# Patient Record
Sex: FEMALE | Race: OTHER
[De-identification: names, ages, dates, MRNs, and addresses within clinical notes are randomized per-mention and may not be internally consistent; named-entity substitution may affect disease eponyms.]

---

## 2017-01-23 ENCOUNTER — HOSPITAL ENCOUNTER (OUTPATIENT)
Dept: HOSPITAL 80 - BHFA | Age: 82
End: 2017-01-23
Attending: INTERNAL MEDICINE
Payer: COMMERCIAL

## 2017-01-23 DIAGNOSIS — R06.02: Primary | ICD-10-CM

## 2017-01-23 PROCEDURE — A9500 TC99M SESTAMIBI: HCPCS

## 2017-01-23 PROCEDURE — 93017 CV STRESS TEST TRACING ONLY: CPT

## 2017-01-23 PROCEDURE — 78452 HT MUSCLE IMAGE SPECT MULT: CPT

## 2017-02-08 ENCOUNTER — HOSPITAL ENCOUNTER (OUTPATIENT)
Dept: HOSPITAL 80 - FIMAGING | Age: 82
End: 2017-02-08
Attending: GENERAL ACUTE CARE HOSPITAL
Payer: COMMERCIAL

## 2017-02-08 DIAGNOSIS — I51.7: ICD-10-CM

## 2017-02-08 DIAGNOSIS — J84.9: Primary | ICD-10-CM

## 2017-02-08 DIAGNOSIS — I77.1: ICD-10-CM

## 2017-02-08 DIAGNOSIS — M40.205: ICD-10-CM

## 2017-02-08 NOTE — DX
Chest, PA and Lateral



History: Dyspnea, R06.02



Comparison: April 11, 2015



Findings: There is diffuse chronic interstitial disease, without focal consolidation or pleural effus
ion. The heart remains enlarged with a globular shape raising the possibility of pericardial effusion
. The pulmonary vascularity is not plethoric. There is chronic tortuosity of the thoracic aorta. Ther
e is possibly stable subcarinal adenopathy vs density related to left atrial enlargement.. There is n
o lung parenchymal mass. There is no pleural effusion or pneumothorax. A focal kyphosis centered at t
he thoracolumbar junction is stable. There are no thoracic compression abnormalities. 



Impression: 

1. Chronic interstitial lung disease. The kyphosis may contribute a restrictive component.

2. Cardiomegaly without failure.

3. Nothing acute identified.

## 2017-09-02 ENCOUNTER — HOSPITAL ENCOUNTER (EMERGENCY)
Dept: HOSPITAL 80 - FED | Age: 82
Discharge: HOME | End: 2017-09-02
Payer: COMMERCIAL

## 2017-09-02 VITALS
SYSTOLIC BLOOD PRESSURE: 123 MMHG | DIASTOLIC BLOOD PRESSURE: 74 MMHG | OXYGEN SATURATION: 93 % | HEART RATE: 82 BPM | RESPIRATION RATE: 18 BRPM | TEMPERATURE: 98.2 F

## 2017-09-02 DIAGNOSIS — Z79.01: ICD-10-CM

## 2017-09-02 DIAGNOSIS — I48.91: Primary | ICD-10-CM

## 2017-09-02 DIAGNOSIS — J81.1: ICD-10-CM

## 2017-09-02 DIAGNOSIS — Z79.82: ICD-10-CM

## 2017-09-02 LAB
% IMMATURE GRANULYOCYTES: 0.5 % (ref 0–1.1)
ABSOLUTE IMMATURE GRANULOCYTES: 0.03 10^3/UL (ref 0–0.1)
ABSOLUTE NRBC COUNT: 0 10^3/UL (ref 0–0.01)
ADD DIFF?: NO
ADD MORPH?: NO
ADD SCAN?: NO
ANION GAP SERPL CALC-SCNC: 12 MEQ/L (ref 8–16)
ATYPICAL LYMPHOCYTE FLAG: 10 (ref 0–99)
CALCIUM SERPL-MCNC: 9.8 MG/DL (ref 8.5–10.4)
CHLORIDE SERPL-SCNC: 96 MEQ/L (ref 97–110)
CO2 SERPL-SCNC: 27 MEQ/L (ref 22–31)
CREAT SERPL-MCNC: 0.8 MG/DL (ref 0.6–1)
ERYTHROCYTE [DISTWIDTH] IN BLOOD BY AUTOMATED COUNT: 16 % (ref 11.5–15.2)
FRAGMENT RBC FLAG: 0 (ref 0–99)
GFR SERPL CREATININE-BSD FRML MDRD: > 60 ML/MIN/{1.73_M2}
GLUCOSE SERPL-MCNC: 115 MG/DL (ref 70–100)
HCT VFR BLD CALC: 32.2 % (ref 38–47)
HGB BLD-MCNC: 10.8 G/DL (ref 12.6–16.3)
INR PPP: 2.54 (ref 0.83–1.16)
LEFT SHIFT FLG: 0 (ref 0–99)
LIPEMIA HEMOLYSIS FLAG: 80 (ref 0–99)
MCH RBC BLDCO QN: 30.2 PG (ref 27.9–34.1)
MCHC RBC AUTO-ENTMCNC: 33.5 G/DL (ref 32.4–36.7)
MCV RBC AUTO: 89.9 FL (ref 81.5–99.8)
NRBC-AUTO%: 0 % (ref 0–0.2)
PLATELET # BLD: 167 10^3/UL (ref 150–400)
PLATELET CLUMPS FLAG: 0 (ref 0–99)
PMV BLD AUTO: 11.4 FL (ref 8.7–11.7)
POTASSIUM SERPL-SCNC: 4.1 MEQ/L (ref 3.5–5.2)
PROTHROMBIN TIME: 27.6 SEC (ref 12–15)
RBC # BLD AUTO: 3.58 10^6/UL (ref 4.18–5.33)
SODIUM SERPL-SCNC: 135 MEQ/L (ref 134–144)
TROPONIN I SERPL-MCNC: < 0.012 NG/ML (ref 0–0.03)

## 2017-09-02 PROCEDURE — 93005 ELECTROCARDIOGRAM TRACING: CPT

## 2017-09-02 PROCEDURE — 71020: CPT

## 2017-09-02 PROCEDURE — 96374 THER/PROPH/DIAG INJ IV PUSH: CPT

## 2017-09-02 PROCEDURE — 99285 EMERGENCY DEPT VISIT HI MDM: CPT

## 2017-09-02 NOTE — EDPHY
H & P


Stated Complaint: bi-lat swelling of feet new SOB


Time Seen by Provider: 09/02/17 08:16


HPI/ROS: 





CHIEF COMPLAINT:  PND, several weeks of leg swelling, history of atrial 

fibrillation





HISTORY OF PRESENT ILLNESS:  The patient presents the emergency department with 

complaints of paroxysmal nocturnal dyspnea increasing over the past several 

nights.  The patient has a history of chronic atrial fibrillation and is 

anticoagulated with Coumadin.  She typically manages her rate with 25 mg of 

metoprolol day.  Over the past day she has taken 50 mg.  The patient recently 

traveled to [facility] and over the past several weeks has developed increasing 

lower extremity edema.  The patient denies any chest pain.  She does report 

mild dyspnea.  The patient's dyspnea was much worse last night however is 

feeling better now that she has been upright.  She denies any complaints of 

fever, acute rash, melena or acute neurologic symptoms.





REVIEW OF SYSTEMS:


A comprehensive 10 point review of systems is otherwise negative aside from 

elements mentioned in the history of present illness.


Source: Patient


Exam Limitations: No limitations





- Personal History


Current Tetanus/Diphtheria Vaccine: Yes


Current Tetanus Diphtheria and Acellular Pertussis (TDAP): Yes


Tetanus Vaccine Date: 10/12/13





- Medical/Surgical History


Hx Asthma: No


Hx Chronic Respiratory Disease: No


Hx Diabetes: No


Hx Cardiac Disease: No


Hx Renal Disease: No


Hx Cirrhosis: No


Hx Alcoholism: No


Hx HIV/AIDS: No


Hx Splenectomy or Spleen Trauma: No


Other PMH: Paroxysmal Afib, hypothyroid, small bowel obstruction, lysis of 

adhesions, osteoarthritis, GERD





- Social History


Smoking Status: Never smoked





- Physical Exam


Exam: 





General Appearance:  Elderly female, no acute distress


Eyes:  Pupils equal and round no pallor or injection


ENT, Mouth:  Mucous membranes moist


Respiratory:  Rales bilateral lung fields


Cardiovascular:  Tachycardic, irregular


Gastrointestinal:  Abdomen is soft and nontender, no masses, bowel sounds normal


Neurological:  A&O, normal motor function, normal sensory exam, normal cranial 

nerves


Skin:  Warm and dry, no rashes


Musculoskeletal:  Neck is supple nontender


Extremities:  1+ bilateral pitting edema





Constitutional: 


 Initial Vital Signs











Temperature (C)  36.4 C   09/02/17 08:12


 


Heart Rate  114 H  09/02/17 08:12


 


Respiratory Rate  16   09/02/17 08:12


 


Blood Pressure  136/95 H  09/02/17 08:12


 


O2 Sat (%)  91 L  09/02/17 08:12








 











O2 Delivery Mode               Room Air














Allergies/Adverse Reactions: 


 





No Known Allergies Allergy (Unverified 09/01/14 03:37)


 








Home Medications: 














 Medication  Instructions  Recorded


 


Calcium Carbonate [Oyster Shell 500 mg PO TIDMEAL 04/24/13





Calcium 500 mg (*)]  


 


Cholecalciferol Vit D3 [Vitamin D3 1,000 units PO DAILY 04/24/13





(*)]  


 


Levothyroxine [Synthroid 50 mcg 50 mcg PO DAILY06 04/24/13





(*)]  


 


Multivitamins [Multivitamin (*)] 1 each PO DAILY 04/24/13


 


Ranitidine HCl [Ranitidine HCl 150 150 mg PO DAILY 04/24/13





mg]  


 


Aspirin EC [Aspirin EC 81 mg (*)] 81 mg PO DAILY@1300 10/14/13


 


Metoprolol Tartrate [Lopressor 25 25 mg PO BID #60 tab 09/08/14





mg (*)]  


 


Hydrocodone/Acetaminophen [Norco 1 each PO Q6 PRN 09/10/14





5/325 (*)]  


 


Furosemide [Lasix 20 MG (*)] 20 mg PO DAILY #7 tab 09/02/17














Medical Decision Making





- Diagnostics


EKG Interpretation: 





EKG:  Complete interpretation has been separately recorded in the [facility] 

archive.  Summary impression:  Atrial fibrillation, rate 100, underlying right 

bundle branch block, unchanged from prior EKG


Imaging Results: 


 Imaging Impressions





Chest X-Ray  09/02/17 08:21


Impression: Stable chest. Diffuse interstitial thickening..











ED Course/Re-evaluation: 





The patient presents to the emergency department with PND, rales on exam and 

lower extremity edema.  The patient's oxygen saturation is 91% on room air.  

She is noted to be in atrial fibrillation with its in the 100-120 range.





The patient had an IV established.  She was placed on a cardiac monitor.  I 

reviewed the patient's past medical records.





The patient's chest x-ray demonstrates no evidence of overwhelming heart 

failure.  She has chronic interstitial lung markings.





The patient received 20 mg of IV Lasix.  The patient's proBNP level was noted 

to be elevated.





Patient was re-evaluated at 11:00 a.m..  She has gone up to the bathroom twice 

since receiving IV Lasix.  She is feeling better.  At this point time she is 

not acutely hypoxic.  She has relative rate control of her atrial fibrillation.

  She would like to manage her symptoms at home today with a small trial of 

Lasix for the next several days.  The patient understands to return to the 

emergency department for any worsening symptoms or other concerns.  I have 

asked her to increase her metoprolol to 50 mg daily.





The patient will follow up with her regular cardiologist.  She is discharged 

home with customary aftercare instructions and return precautions.  At 11:50 

a.m. patient's blood pressure is normal, heart rate is 89, oxygen saturation 94

% on room air.


Differential Diagnosis: 





Differential diagnosis considered includes atrial fibrillation, congestive 

heart failure, peripheral edema, myocardial infarction, pulmonary embolism is 

felt to be on likely secondary to her chronic anticoagulation.





- Data Points


Laboratory Results: 


 Laboratory Results





 09/02/17 08:40 





 09/02/17 08:40 





 











  09/02/17 09/02/17 09/02/17





  08:40 08:40 08:40


 


WBC      6.27 10^3/uL 10^3/uL





     (3.80-9.50) 


 


RBC      3.58 10^6/uL L 10^6/uL





     (4.18-5.33) 


 


Hgb      10.8 g/dL L g/dL





     (12.6-16.3) 


 


Hct      32.2 % L %





     (38.0-47.0) 


 


MCV      89.9 fL fL





     (81.5-99.8) 


 


MCH      30.2 pg pg





     (27.9-34.1) 


 


MCHC      33.5 g/dL g/dL





     (32.4-36.7) 


 


RDW      16.0 % H %





     (11.5-15.2) 


 


Plt Count      167 10^3/uL 10^3/uL





     (150-400) 


 


MPV      11.4 fL fL





     (8.7-11.7) 


 


Neut % (Auto)      79.8 % H %





     (39.3-74.2) 


 


Lymph % (Auto)      12.3 % L %





     (15.0-45.0) 


 


Mono % (Auto)      5.1 % %





     (4.5-13.0) 


 


Eos % (Auto)      1.3 % %





     (0.6-7.6) 


 


Baso % (Auto)      1.0 % %





     (0.3-1.7) 


 


Nucleat RBC Rel Count      0.0 % %





     (0.0-0.2) 


 


Absolute Neuts (auto)      5.01 10^3/uL 10^3/uL





     (1.70-6.50) 


 


Absolute Lymphs (auto)      0.77 10^3/uL L 10^3/uL





     (1.00-3.00) 


 


Absolute Monos (auto)      0.32 10^3/uL 10^3/uL





     (0.30-0.80) 


 


Absolute Eos (auto)      0.08 10^3/uL 10^3/uL





     (0.03-0.40) 


 


Absolute Basos (auto)      0.06 10^3/uL 10^3/uL





     (0.02-0.10) 


 


Absolute Nucleated RBC      0.00 10^3/uL 10^3/uL





     (0-0.01) 


 


Immature Gran %      0.5 % %





     (0.0-1.1) 


 


Immature Gran #      0.03 10^3/uL 10^3/uL





     (0.00-0.10) 


 


PT    27.6 SEC H SEC  





    (12.0-15.0)  


 


INR    2.54  H   





    (0.83-1.16)  


 


Sodium  135 mEq/L mEq/L    





   (134-144)   


 


Potassium  4.1 mEq/L mEq/L    





   (3.5-5.2)   


 


Chloride  96 mEq/L L mEq/L    





   ()   


 


Carbon Dioxide  27 mEq/l mEq/l    





   (22-31)   


 


Anion Gap  12 mEq/L mEq/L    





   (8-16)   


 


BUN  17 mg/dL mg/dL    





   (7-23)   


 


Creatinine  0.8 mg/dL mg/dL    





   (0.6-1.0)   


 


Estimated GFR  > 60     





    


 


Glucose  115 mg/dL H mg/dL    





   ()   


 


Calcium  9.8 mg/dL mg/dL    





   (8.5-10.4)   


 


Troponin I  < 0.012 ng/mL ng/mL    





   (0.000-0.034)   


 


NT-Pro-B Natriuret Pep  5530 pg/mL H pg/mL    





   (0-450)   











Medications Given: 


 








Discontinued Medications





Furosemide (Lasix Injection)  20 mg IVP EDNOW ONE


   Stop: 09/02/17 09:25


   Last Admin: 09/02/17 09:33 Dose:  20 mg








Departure





- Departure


Disposition: Home, Routine, Self-Care


Clinical Impression: 


 Dyspnea, Pulmonary edema, Atrial fibrillation





Condition: Good


Instructions:  A-fib (Atrial Fibrillation) (ED)


Additional Instructions: 


1.  Take a full 50 mg dose of metoprolol daily.


2.  Begin Lasix as directed for the next 5 days - 20 mg tablet once daily.


3.  Please return to the ED for any markedly worsening symptoms of chest pain, 

shortness of breath or other concerns.


4.  Please follow up with your regular cardiologist for a recheck early next 

week.








Referrals: 


Jacky Burk MD [Medical Doctor] - As per Instructions


Prescriptions: 


Furosemide [Lasix 20 MG (*)] 20 mg PO DAILY #7 tab

## 2017-09-02 NOTE — CPEKG
Heart Rate: 100

RR Interval: 600

QRSD Interval: 124

QT Interval: 352

QTC Interval: 454

QRS Axis: 45

T Wave Axis: 216

EKG Severity - ABNORMAL ECG -

EKG Impression: ATRIAL FIBRILLATION, V-RATE 

EKG Impression: RIGHT BUNDLE BRANCH BLOCK

Electronically Signed By: Chava Méndez 02-Sep-2017 08:35:22

## 2017-09-15 ENCOUNTER — HOSPITAL ENCOUNTER (OUTPATIENT)
Dept: HOSPITAL 80 - FIMAGING | Age: 82
End: 2017-09-15
Attending: FAMILY MEDICINE
Payer: COMMERCIAL

## 2017-09-15 DIAGNOSIS — Z12.31: Primary | ICD-10-CM

## 2017-09-15 PROCEDURE — G0202 SCR MAMMO BI INCL CAD: HCPCS

## 2018-04-24 ENCOUNTER — HOSPITAL ENCOUNTER (EMERGENCY)
Dept: HOSPITAL 80 - FED | Age: 83
Discharge: HOME | End: 2018-04-24
Payer: COMMERCIAL

## 2018-04-24 VITALS — SYSTOLIC BLOOD PRESSURE: 128 MMHG | DIASTOLIC BLOOD PRESSURE: 73 MMHG

## 2018-04-24 DIAGNOSIS — R07.0: Primary | ICD-10-CM

## 2018-04-24 DIAGNOSIS — Z87.891: ICD-10-CM

## 2018-04-24 LAB
INR PPP: 3.12 (ref 0.83–1.16)
PLATELET # BLD: 145 10^3/UL (ref 150–400)
PROTHROMBIN TIME: 31.9 SEC (ref 12–15)

## 2018-04-24 PROCEDURE — 96374 THER/PROPH/DIAG INJ IV PUSH: CPT

## 2018-04-24 PROCEDURE — 70491 CT SOFT TISSUE NECK W/DYE: CPT

## 2018-04-24 PROCEDURE — 99285 EMERGENCY DEPT VISIT HI MDM: CPT

## 2018-04-24 NOTE — EDPHY
H & P


Time Seen by Provider: 04/24/18 04:08


HPI/ROS: 





HPI





CHIEF COMPLAINT:  Severe throat pain.





HISTORY OF PRESENT ILLNESS:  Patient very pleasant 82-year-old female she has a 

history of AFib on Coumadin, thyroid disease, she presents emergency room with 

severe throat pain.  Patient states she woke up to have a drink around 2:00 

a.m. Try to take a big gulp of water and developed severe throat pain.  States 

she went to bed fine however she did have a bloody nose earlier in the evening 

and did take a dose of Afrin.  Otherwise denies cough, chest pain, shortness of 

breath, denies extremity pain.  Denies headache or posterior neck pain.  Pain 

is located to the posterior pharynx pain when she swallows.  She denies having 

any trouble swallowing but has pain when she swallows.





Past Medical History:  AFib on Coumadin, thyroid disease, GERD





Past Surgical History:  No recent surgery





Social History:  Lives locally, family at bedside.  Denies drugs alcohol 

tobacco products.





Family History:  Noncontributory








ROS   


REVIEW OF SYSTEMS:


A comprehensive 10 point review of systems is otherwise negative aside from 

elements mentioned in the history of present illness.








Exam   


Constitutional appears well nontoxic no acute distress, frail elderly,  triage 

nursing summary reviewed, vital signs reviewed, awake/alert. 


Eyes   normal conjunctivae and sclera, EOMI, PERRLA. 


HENT posterior pharynx is unremarkable no significant swelling or erythema, no 

exudate, uvula midline, tonsillar tissue was absent, no neck swelling, no Mark

's, no stridor, no drooling, no trouble breathing, moist mucus membranes, no 

epistaxis, neck supple/ no meningismus, no raccoon eyes. 


Respiratory   clear to auscultation bilaterally, normal breath sounds, no 

respiratory distress, no wheezing. 


Cardiovascular   rate normal, regular rhythm, no murmur, no edema, distal 

pulses normal. 


Gastrointestinal   soft, non-tender, no rebound, no guarding, normal bowel 

sounds, no distension, no pulsatile mass. 


Genitourinary   no CVA tenderness. 


Musculoskeletal  no midline vertebral tenderness, full range of motion, no calf 

swelling, no tenderness of extremities, no meningismus, good pulses, 

neurovascularly intact.


Skin   pink, warm, & dry, no rash, skin atraumatic. 


Neurologic   awake, alert and oriented x 3, AAOx3, moves all 4 extremities 

equally, motor intact, sensory intact, CN II-XII intact, normal cerebellar, 

normal vision, normal speech. 


Psychiatric   normal mood/affect. 


Heme/Lymph/Immune   no lymphadenopathy.





Differential Diagnosis:  Includes but is not limited to in a particular order 

pharyngitis, viral pharyngitis, bacterial pharyngitis, GERD, retropharyngeal 

abscess, infection, foreign body, throat irritation





Medical Decision Making:  Plan for this patient IV establishment basic blood 

draw, check CBC and chemistry, check INR as she is on Coumadin had a nosebleed 

earlier, CT scan soft tissue with IV contrast of the neck, GI cocktail to see 

if this improves her symptoms.





Re-evaluation:








0615:  Re-evaluation at this time patient states that her pain when she 

swallows has resolved after the lidocaine neb she feels much better.  Still 

pending CT soft tissue neck with IV contrast to be red and reported.





0723:  I did re-evaluate the patient she is resting comfortably feels much 

better.  She is able swallow without any difficulty.  She states she feels much 

better.  Her CT scan soft tissue neck with IV contrast did not show any acute 

inflammatory process this was called to me by Dr. Penn.





No evidence of inflammation on the CT scan.  Blood work is reassuring.  

Coumadin level 3.5.  She has no stridor no trouble breathing.





Discussed return precautions with the son at bedside.  Additionally will given 

ENT referral K she continues to have some throat pain.


I do not see any evidence of infection on exam.





Blood work and CT reassuring.





Return precautions discussed.





Recommend cold fluids today and a soft diet.


Source: Patient





- Personal History


Tetanus Vaccine Date: 10/12/13





- Medical/Surgical History


Hx Asthma: No


Hx Chronic Respiratory Disease: No


Hx Diabetes: No


Hx Cardiac Disease: No


Hx Renal Disease: No


Hx Cirrhosis: No


Hx Alcoholism: No


Hx HIV/AIDS: No


Hx Splenectomy or Spleen Trauma: No


Other PMH: Paroxysmal Afib, hypothyroid, small bowel obstruction, lysis of 

adhesions, osteoarthritis, GERD





- Social History


Smoking Status: Never smoked


Constitutional: 


 Initial Vital Signs











Temperature (C)  36.4 C   04/24/18 04:00


 


Heart Rate  79   04/24/18 04:00


 


Respiratory Rate  18   04/24/18 04:00


 


Blood Pressure  132/97 H  04/24/18 04:00


 


O2 Sat (%)  96   04/24/18 04:00








 











O2 Delivery Mode               Room Air














Allergies/Adverse Reactions: 


 





No Known Allergies Allergy (Unverified 04/24/18 04:13)


 








Home Medications: 














 Medication  Instructions  Recorded


 


Calcium Carbonate [Oyster Shell 500 mg PO TIDMEAL 04/24/13





Calcium 500 mg (*)]  


 


Cholecalciferol Vit D3 [Vitamin D3 1,000 units PO DAILY 04/24/13





(*)]  


 


Levothyroxine [Synthroid 50 mcg 50 mcg PO DAILY06 04/24/13





(*)]  


 


Multivitamins [Multivitamin (*)] 1 each PO DAILY 04/24/13


 


Ranitidine HCl [Ranitidine HCl 150 150 mg PO DAILY 04/24/13





mg]  


 


Aspirin EC [Aspirin EC 81 mg (*)] 81 mg PO DAILY@1300 10/14/13


 


Metoprolol Tartrate [Lopressor 25 25 mg PO BID #60 tab 09/08/14





mg (*)]  


 


Hydrocodone/Acetaminophen [Norco 1 each PO Q6 PRN 09/10/14





5/325 (*)]  


 


Furosemide [Lasix 20 MG (*)] 20 mg PO DAILY #7 tab 09/02/17














Medical Decision Making





- Data Points


Laboratory Results: 


 Laboratory Results





 04/24/18 04:20 





 04/24/18 04:20 





 











  04/24/18 04/24/18 04/24/18





  04:20 04:20 04:20


 


WBC      4.69 10^3/uL 10^3/uL





     (3.80-9.50) 


 


RBC      3.61 10^6/uL L 10^6/uL





     (4.18-5.33) 


 


Hgb      10.9 g/dL L g/dL





     (12.6-16.3) 


 


Hct      32.2 % L %





     (38.0-47.0) 


 


MCV      89.2 fL fL





     (81.5-99.8) 


 


MCH      30.2 pg pg





     (27.9-34.1) 


 


MCHC      33.9 g/dL g/dL





     (32.4-36.7) 


 


RDW      15.3 % H %





     (11.5-15.2) 


 


Plt Count      145 10^3/uL L 10^3/uL





     (150-400) 


 


MPV      10.7 fL fL





     (8.7-11.7) 


 


Neut % (Auto)      67.8 % %





     (39.3-74.2) 


 


Lymph % (Auto)      19.6 % %





     (15.0-45.0) 


 


Mono % (Auto)      9.0 % %





     (4.5-13.0) 


 


Eos % (Auto)      1.9 % %





     (0.6-7.6) 


 


Baso % (Auto)      1.3 % %





     (0.3-1.7) 


 


Nucleat RBC Rel Count      0.0 % %





     (0.0-0.2) 


 


Absolute Neuts (auto)      3.18 10^3/uL 10^3/uL





     (1.70-6.50) 


 


Absolute Lymphs (auto)      0.92 10^3/uL L 10^3/uL





     (1.00-3.00) 


 


Absolute Monos (auto)      0.42 10^3/uL 10^3/uL





     (0.30-0.80) 


 


Absolute Eos (auto)      0.09 10^3/uL 10^3/uL





     (0.03-0.40) 


 


Absolute Basos (auto)      0.06 10^3/uL 10^3/uL





     (0.02-0.10) 


 


Absolute Nucleated RBC      0.00 10^3/uL 10^3/uL





     (0-0.01) 


 


Immature Gran %      0.4 % %





     (0.0-1.1) 


 


Immature Gran #      0.02 10^3/uL 10^3/uL





     (0.00-0.10) 


 


PT    31.9 SEC H SEC  





    (12.0-15.0)  


 


INR    3.12  H   





    (0.83-1.16)  


 


APTT    49.4 SEC H SEC  





    (23.0-38.0)  


 


Sodium  134 mEq/L L mEq/L    





   (135-145)   


 


Potassium  3.9 mEq/L mEq/L    





   (3.5-5.2)   


 


Chloride  95 mEq/L L mEq/L    





   ()   


 


Carbon Dioxide  28 mEq/l mEq/l    





   (22-31)   


 


Anion Gap  11 mEq/L mEq/L    





   (8-16)   


 


BUN  25 mg/dL H mg/dL    





   (7-23)   


 


Creatinine  0.8 mg/dL mg/dL    





   (0.6-1.0)   


 


Estimated GFR  > 60     





    


 


Glucose  101 mg/dL H mg/dL    





   ()   


 


Calcium  9.3 mg/dL mg/dL    





   (8.5-10.4)   











Medications Given: 


 








Discontinued Medications





Al Hydroxide/Mg Hydroxide (Maalox Susp)  30 ml PO ONCE ONE


   Stop: 04/24/18 04:18


   Last Admin: 04/24/18 04:33 Dose:  30 ml


Hyoscyamine Sulfate (Levsin, Hyomax-Sl)  0.25 mg PO ONCE ONE


   Stop: 04/24/18 04:18


   Last Admin: 04/24/18 04:33 Dose:  0.25 mg


Lidocaine (Lidocaine 2% Viscous)  15 ml PO ONCE ONE


   Stop: 04/24/18 04:18


   Last Admin: 04/24/18 04:33 Dose:  15 ml


Lidocaine HCl (Xylocaine-Mpf 1.5%)  10 ml NB ONCE ONE


   Stop: 04/24/18 05:01


   Last Admin: 04/24/18 05:26 Dose:  10 ml








Departure





- Departure


Disposition: Home, Routine, Self-Care


Clinical Impression: 


 Throat pain





Condition: Good


Instructions:  Pharyngitis (ED)


Additional Instructions: 


1. Cold fluids today.


2. Return emergency room if you have any worsening symptoms includes worsening 

throat pain, fever, vomiting


3. Soft foods today.


4. Return emergency room if worsening pain.


5. Follow up with ENT.


Referrals: 


Diana Mark MD [Primary Care Provider] - As per Instructions


Ramsey Wing MD [Medical Doctor] - As per Instructions

## 2018-06-11 ENCOUNTER — HOSPITAL ENCOUNTER (OUTPATIENT)
Dept: HOSPITAL 80 - FIMAGING | Age: 83
End: 2018-06-11
Attending: PHYSICIAN ASSISTANT
Payer: COMMERCIAL

## 2018-06-11 DIAGNOSIS — R31.29: Primary | ICD-10-CM

## 2018-06-11 DIAGNOSIS — R35.0: ICD-10-CM

## 2018-07-28 ENCOUNTER — HOSPITAL ENCOUNTER (OUTPATIENT)
Dept: HOSPITAL 80 - FIMAGING | Age: 83
End: 2018-07-28
Attending: FAMILY MEDICINE
Payer: COMMERCIAL

## 2018-07-28 DIAGNOSIS — M50.30: Primary | ICD-10-CM

## 2018-07-28 DIAGNOSIS — M40.204: ICD-10-CM

## 2018-07-28 DIAGNOSIS — E03.9: ICD-10-CM

## 2018-09-22 ENCOUNTER — HOSPITAL ENCOUNTER (OUTPATIENT)
Dept: HOSPITAL 80 - FIMAGING | Age: 83
End: 2018-09-22
Attending: FAMILY MEDICINE
Payer: COMMERCIAL

## 2018-09-22 DIAGNOSIS — Z12.31: Primary | ICD-10-CM

## 2018-10-05 ENCOUNTER — HOSPITAL ENCOUNTER (OUTPATIENT)
Dept: HOSPITAL 80 - BHFA | Age: 83
End: 2018-10-05
Attending: INTERNAL MEDICINE
Payer: COMMERCIAL

## 2018-10-05 DIAGNOSIS — I48.91: Primary | ICD-10-CM

## 2019-03-31 ENCOUNTER — HOSPITAL ENCOUNTER (EMERGENCY)
Dept: HOSPITAL 80 - FED | Age: 84
Discharge: HOME | End: 2019-03-31
Payer: COMMERCIAL

## 2019-03-31 VITALS — SYSTOLIC BLOOD PRESSURE: 133 MMHG | DIASTOLIC BLOOD PRESSURE: 70 MMHG

## 2019-03-31 DIAGNOSIS — J02.9: Primary | ICD-10-CM

## 2019-03-31 NOTE — EDPHY
HPI/HX/ROS/PE/MDM


Narrative: 





CHIEF COMPLAINT:  Sore throat





HISTORY OF PRESENT ILLNESS:  The patient is an 84 y/o female with a history of 

atrial fibrillation and thyroid condition complaining of pain in the right side 

of the throat. Around 3:30 AM, she awoke with pain in the right side of her 

throat. She drank some water and found it painful to swallow. She had a Ricola 

lozenge which improved her symptoms. This morning, she had pain when eating and 

drinking. The pain worsened over the course of the day. She denies congestion, 

headache, fever, or any other associated symptoms. She denies any changes in 

medication. 





No fever, chills, chest pain, shortness of breath, palpitations, vomiting, 

diarrhea, urinary complaints, headache, lightheadedness. 





REVIEW OF SYSTEMS:


A comprehensive 10 system review of systems is otherwise negative aside from 

elements mentioned in the history of present illness and medical decision making





PAST MEDICAL HISTORY:   Atrial fibrillation, thyroid condition





SOCIAL HISTORY:   Lives in [place], retired, son at bedside





VITAL SIGNS: Reviewed by me


GENERAL: Well-developed, well-nourished, resting comfortably in no respiratory 

distress.


HEENT: Atraumatic. Eyes: No icterus, no injection. Mouth: "Cobblestoning" in 

the posterior pharynx. Moist mucous membranes.  No erythema or lesions. Neck: 

supple with no adenopathy. No tenderness to palpation.  No goiter palpable.


LUNGS: Clear to auscultation bilaterally, no wheezes, rhonchi or rales.


CARDIAC: Regularly irregular rate and rhythm, no rubs, murmurs or gallops.


ABDOMEN: Soft, nontender, nondistended, bowel sounds normal.


BACK:  No CVA tenderness.


EXTREMITIES: No trauma. No edema.  Range of motion is normal throughout.


NEURO: Alert and oriented,  grossly nonfocal.  


SKIN: Warm and dry, no rash.


PSYCHIATRIC: Normal mentation, no agitation.





ED Course: 





The patient presents with pain in the right side of the throat when swallowing. 

On exam, she has mild posterior pharyngeal erythema and cobblestoning and no 

other findings. This is almost exactly a year from her last ED visit for the 

same complaint. I feel this may be related to seasonal allergies as the patient 

describes constant runny nose which is worse in the spring. Plan for strep 

screen to evaluate strep. 





19:10 - Strep is negative. I feel she is safe to return home with ENT follow up 

if symptoms do not improve. She agrees to this course of action.  Please see 

the discharge instructions


MDM: 





Differential diagnosis for the patient's sore throat was considered including 

but not limited to viral pharyngitis, bacterial pharyngitis, postnasal drip, 

seasonal allergies, thyroid nodule, tonsillitis, tonsillar abscess, foreign body

, epiglottitis, bacterial tracheitis.





- Data Points


Medications Given: 


 








Discontinued Medications





Acetaminophen (Tylenol)  650 mg PO EDNOW ONE


   Stop: 03/31/19 18:52


   Last Admin: 03/31/19 18:59 Dose:  650 mg


Dexamethasone (Decadron)  8 mg PO EDNOW ONE


   Stop: 03/31/19 18:52


   Last Admin: 03/31/19 19:00 Dose:  8 mg








General


Time Seen by Provider: 03/31/19 18:24


Initial Vital Signs: 


 Initial Vital Signs











Temperature (C)  36.5 C   03/31/19 18:18


 


Heart Rate  112 H  03/31/19 18:18


 


Respiratory Rate  18   03/31/19 18:18


 


Blood Pressure  130/86 H  03/31/19 18:18


 


O2 Sat (%)  100   03/31/19 18:18








 











O2 Delivery Mode               Room Air














Allergies/Adverse Reactions: 


 





No Known Allergies Allergy (Verified 03/31/19 18:16)


 








Home Medications: 














 Medication  Instructions  Recorded


 


Calcium Carbonate [Oyster Shell 500 mg PO TIDMEAL 04/24/13





Calcium 500 mg (*)]  


 


Cholecalciferol Vit D3 [Vitamin D3 1,000 units PO DAILY 04/24/13





(*)]  


 


Levothyroxine [Synthroid 50 mcg 50 mcg PO DAILY06 04/24/13





(*)]  


 


Multivitamins [Multivitamin (*)] 1 each PO DAILY 04/24/13


 


Ranitidine HCl [Ranitidine HCl 150 150 mg PO DAILY 04/24/13





mg]  


 


Aspirin EC [Aspirin EC 81 mg (*)] 81 mg PO DAILY@1300 10/14/13


 


Metoprolol Tartrate [Lopressor 25 25 mg PO BID #60 tab 09/08/14





mg (*)]  


 


Hydrocodone/Acetaminophen [Norco 1 each PO Q6 PRN 09/10/14





5/325 (*)]  


 


Furosemide [Lasix 20 MG (*)] 20 mg PO DAILY #7 tab 09/02/17














Departure





- Departure


Disposition: Home, Routine, Self-Care


Clinical Impression: 


 Sore throat





Condition: Good


Instructions:  Pharyngitis (ED)


Additional Instructions: 


1. Please take tylenol as needed for pain. Gargle with warm salt water to help 

with pain and inflammation.





2. Follow up with an ENT if symptoms do not improve in 1 week.





3. Consider an over-the-counter nonsedating antihistamine for the runny nose 

especially if it is worse in the spring.  





4. Return to the emergency department if you experience fever, weakness, 

vomiting, or any other worsening of condition. 


Referrals: 


Kelsi Rousseau MD [Primary Care Provider] - As per Instructions


Elvira Chandra MD [Medical Doctor] - As per Instructions


Report Scribed for: Margarita Phipps


Report Scribed by: Jami Shepherd


Date of Report: 03/31/19


Time of Report: 18:53


Physician Review and Approval Statement: Portions of this note were transcribed 

by a medical scribe.  I personally performed a history, physical exam, medical 

decision making, and confirmed accuracy of information the transcribed note.

## 2019-04-26 ENCOUNTER — HOSPITAL ENCOUNTER (OUTPATIENT)
Dept: HOSPITAL 80 - FIMAGING | Age: 84
End: 2019-04-26
Attending: PHYSICIAN ASSISTANT
Payer: COMMERCIAL

## 2019-04-26 DIAGNOSIS — M25.775: Primary | ICD-10-CM

## 2019-04-26 DIAGNOSIS — M25.872: ICD-10-CM
